# Patient Record
Sex: FEMALE | Race: WHITE | NOT HISPANIC OR LATINO | Employment: FULL TIME | RURAL
[De-identification: names, ages, dates, MRNs, and addresses within clinical notes are randomized per-mention and may not be internally consistent; named-entity substitution may affect disease eponyms.]

---

## 2022-12-09 ENCOUNTER — OFFICE VISIT (OUTPATIENT)
Dept: PRIMARY CARE CLINIC | Facility: CLINIC | Age: 37
End: 2022-12-09
Payer: COMMERCIAL

## 2022-12-09 VITALS
TEMPERATURE: 98 F | DIASTOLIC BLOOD PRESSURE: 98 MMHG | WEIGHT: 138.38 LBS | HEART RATE: 82 BPM | BODY MASS INDEX: 27.9 KG/M2 | SYSTOLIC BLOOD PRESSURE: 140 MMHG | HEIGHT: 59 IN | RESPIRATION RATE: 18 BRPM | OXYGEN SATURATION: 100 %

## 2022-12-09 DIAGNOSIS — F32.A ANXIETY AND DEPRESSION: ICD-10-CM

## 2022-12-09 DIAGNOSIS — F41.9 ANXIETY AND DEPRESSION: ICD-10-CM

## 2022-12-09 DIAGNOSIS — F17.200 TOBACCO DEPENDENCE: ICD-10-CM

## 2022-12-09 DIAGNOSIS — R03.0 ELEVATED BP WITHOUT DIAGNOSIS OF HYPERTENSION: Primary | ICD-10-CM

## 2022-12-09 LAB
ANION GAP SERPL CALCULATED.3IONS-SCNC: 9 MMOL/L (ref 7–16)
BASOPHILS # BLD AUTO: 0.06 K/UL (ref 0–0.2)
BASOPHILS NFR BLD AUTO: 0.6 % (ref 0–1)
BUN SERPL-MCNC: 14 MG/DL (ref 7–18)
BUN/CREAT SERPL: 18 (ref 6–20)
CALCIUM SERPL-MCNC: 8.8 MG/DL (ref 8.5–10.1)
CHLORIDE SERPL-SCNC: 110 MMOL/L (ref 98–107)
CO2 SERPL-SCNC: 23 MMOL/L (ref 21–32)
CREAT SERPL-MCNC: 0.78 MG/DL (ref 0.55–1.02)
DIFFERENTIAL METHOD BLD: ABNORMAL
EGFR (NO RACE VARIABLE) (RUSH/TITUS): 102 ML/MIN/1.73M²
EOSINOPHIL # BLD AUTO: 0.23 K/UL (ref 0–0.5)
EOSINOPHIL NFR BLD AUTO: 2.3 % (ref 1–4)
ERYTHROCYTE [DISTWIDTH] IN BLOOD BY AUTOMATED COUNT: 16.9 % (ref 11.5–14.5)
FERRITIN SERPL-MCNC: 5 NG/ML (ref 8–252)
GLUCOSE SERPL-MCNC: 94 MG/DL (ref 74–106)
HCT VFR BLD AUTO: 39.9 % (ref 38–47)
HGB BLD-MCNC: 12.1 G/DL (ref 12–16)
IMM GRANULOCYTES # BLD AUTO: 0.04 K/UL (ref 0–0.04)
IMM GRANULOCYTES NFR BLD: 0.4 % (ref 0–0.4)
LYMPHOCYTES # BLD AUTO: 2.04 K/UL (ref 1–4.8)
LYMPHOCYTES NFR BLD AUTO: 20.8 % (ref 27–41)
MCH RBC QN AUTO: 25.9 PG (ref 27–31)
MCHC RBC AUTO-ENTMCNC: 30.3 G/DL (ref 32–36)
MCV RBC AUTO: 85.3 FL (ref 80–96)
MONOCYTES # BLD AUTO: 1.06 K/UL (ref 0–0.8)
MONOCYTES NFR BLD AUTO: 10.8 % (ref 2–6)
MPC BLD CALC-MCNC: 12.5 FL (ref 9.4–12.4)
NEUTROPHILS # BLD AUTO: 6.39 K/UL (ref 1.8–7.7)
NEUTROPHILS NFR BLD AUTO: 65.1 % (ref 53–65)
NRBC # BLD AUTO: 0 X10E3/UL
NRBC, AUTO (.00): 0 %
PLATELET # BLD AUTO: 325 K/UL (ref 150–400)
POTASSIUM SERPL-SCNC: 4.4 MMOL/L (ref 3.5–5.1)
RBC # BLD AUTO: 4.68 M/UL (ref 4.2–5.4)
SODIUM SERPL-SCNC: 138 MMOL/L (ref 136–145)
T4 FREE SERPL-MCNC: 0.96 NG/DL (ref 0.76–1.46)
TSH SERPL DL<=0.005 MIU/L-ACNC: 3.04 UIU/ML (ref 0.36–3.74)
WBC # BLD AUTO: 9.82 K/UL (ref 4.5–11)

## 2022-12-09 PROCEDURE — 82728 ASSAY OF FERRITIN: CPT | Mod: ,,, | Performed by: CLINICAL MEDICAL LABORATORY

## 2022-12-09 PROCEDURE — 84443 THYROID PANEL: ICD-10-PCS | Mod: ,,, | Performed by: CLINICAL MEDICAL LABORATORY

## 2022-12-09 PROCEDURE — 82728 FERRITIN: ICD-10-PCS | Mod: ,,, | Performed by: CLINICAL MEDICAL LABORATORY

## 2022-12-09 PROCEDURE — 3077F SYST BP >= 140 MM HG: CPT | Mod: ,,, | Performed by: STUDENT IN AN ORGANIZED HEALTH CARE EDUCATION/TRAINING PROGRAM

## 2022-12-09 PROCEDURE — 1159F PR MEDICATION LIST DOCUMENTED IN MEDICAL RECORD: ICD-10-PCS | Mod: ,,, | Performed by: STUDENT IN AN ORGANIZED HEALTH CARE EDUCATION/TRAINING PROGRAM

## 2022-12-09 PROCEDURE — 80048 BASIC METABOLIC PANEL: ICD-10-PCS | Mod: ,,, | Performed by: CLINICAL MEDICAL LABORATORY

## 2022-12-09 PROCEDURE — 99406 BEHAV CHNG SMOKING 3-10 MIN: CPT | Mod: ,,, | Performed by: STUDENT IN AN ORGANIZED HEALTH CARE EDUCATION/TRAINING PROGRAM

## 2022-12-09 PROCEDURE — 99204 PR OFFICE/OUTPT VISIT, NEW, LEVL IV, 45-59 MIN: ICD-10-PCS | Mod: 25,,, | Performed by: STUDENT IN AN ORGANIZED HEALTH CARE EDUCATION/TRAINING PROGRAM

## 2022-12-09 PROCEDURE — 99406 PR TOBACCO USE CESSATION INTERMEDIATE 3-10 MINUTES: ICD-10-PCS | Mod: ,,, | Performed by: STUDENT IN AN ORGANIZED HEALTH CARE EDUCATION/TRAINING PROGRAM

## 2022-12-09 PROCEDURE — 84443 ASSAY THYROID STIM HORMONE: CPT | Mod: ,,, | Performed by: CLINICAL MEDICAL LABORATORY

## 2022-12-09 PROCEDURE — 3008F BODY MASS INDEX DOCD: CPT | Mod: ,,, | Performed by: STUDENT IN AN ORGANIZED HEALTH CARE EDUCATION/TRAINING PROGRAM

## 2022-12-09 PROCEDURE — 3008F PR BODY MASS INDEX (BMI) DOCUMENTED: ICD-10-PCS | Mod: ,,, | Performed by: STUDENT IN AN ORGANIZED HEALTH CARE EDUCATION/TRAINING PROGRAM

## 2022-12-09 PROCEDURE — 84439 THYROID PANEL: ICD-10-PCS | Mod: ,,, | Performed by: CLINICAL MEDICAL LABORATORY

## 2022-12-09 PROCEDURE — 1159F MED LIST DOCD IN RCRD: CPT | Mod: ,,, | Performed by: STUDENT IN AN ORGANIZED HEALTH CARE EDUCATION/TRAINING PROGRAM

## 2022-12-09 PROCEDURE — 99204 OFFICE O/P NEW MOD 45 MIN: CPT | Mod: 25,,, | Performed by: STUDENT IN AN ORGANIZED HEALTH CARE EDUCATION/TRAINING PROGRAM

## 2022-12-09 PROCEDURE — 3080F DIAST BP >= 90 MM HG: CPT | Mod: ,,, | Performed by: STUDENT IN AN ORGANIZED HEALTH CARE EDUCATION/TRAINING PROGRAM

## 2022-12-09 PROCEDURE — 80048 BASIC METABOLIC PNL TOTAL CA: CPT | Mod: ,,, | Performed by: CLINICAL MEDICAL LABORATORY

## 2022-12-09 PROCEDURE — 84439 ASSAY OF FREE THYROXINE: CPT | Mod: ,,, | Performed by: CLINICAL MEDICAL LABORATORY

## 2022-12-09 PROCEDURE — 3080F PR MOST RECENT DIASTOLIC BLOOD PRESSURE >= 90 MM HG: ICD-10-PCS | Mod: ,,, | Performed by: STUDENT IN AN ORGANIZED HEALTH CARE EDUCATION/TRAINING PROGRAM

## 2022-12-09 PROCEDURE — 3077F PR MOST RECENT SYSTOLIC BLOOD PRESSURE >= 140 MM HG: ICD-10-PCS | Mod: ,,, | Performed by: STUDENT IN AN ORGANIZED HEALTH CARE EDUCATION/TRAINING PROGRAM

## 2022-12-09 RX ORDER — BUSPIRONE HYDROCHLORIDE 5 MG/1
5 TABLET ORAL 2 TIMES DAILY PRN
Qty: 60 TABLET | Refills: 2 | Status: SHIPPED | OUTPATIENT
Start: 2022-12-09 | End: 2023-03-30 | Stop reason: SDUPTHER

## 2022-12-09 RX ORDER — ESCITALOPRAM OXALATE 10 MG/1
10 TABLET ORAL DAILY
Qty: 30 TABLET | Refills: 1 | Status: SHIPPED | OUTPATIENT
Start: 2022-12-09 | End: 2023-03-30 | Stop reason: DRUGHIGH

## 2022-12-09 NOTE — LETTER
December 9, 2022      [facility]  [address] [facility] [place] [address]  Phone: [phone]  Fax: [phone]       Patient: Karin Wright   YOB: 1985  Date of Visit: 12/09/2022    To Whom It May Concern:    Abbey Wright  was at [facility] on 12/09/2022. The patient should be excused from 12/8-12/10 and may return to work/school on 12/12/22 with no restrictions. If you have any questions or concerns, or if I can be of further assistance, please do not hesitate to contact me.    Sincerely,    Heidi Ovalle MD

## 2022-12-09 NOTE — PROGRESS NOTES
"Subjective:      Karin Wright is a 37 y.o.female who presents to clinic for Hypertension (Pt says she was sent home from work because her BP was high), Chest Pain, and Anxiety    Patient presents to clinic needing a work clearance form filled out and with complaints of anxiety.    Work clearance:  She was sent home from work yesterday due to elevated blood pressure (130/100). She states she was angry at the time, began having a headache and then chest pain and that is what caused them to check her blood pressure. She has never been diagnosed with blood pressure in the past. Denies any symptoms currently except for SOB which is chronic due to cigarette smoking.     Smoking:  She smokes 1/2ppd for the last 15 years. She has tried to stop on her own through quitting cold turkey, nicotine gum, and patches. She does have a history of seizures and is not currently on any medications for it. Last seizure was over 3 years ago. She thinks her seizures stopped because she stopped smoking marijuana. She lives with her wife and kids.     Anxiety:  She has a history of anxiety and depression that was treated with trazodone and lexapro in the past. She stopped taking her medications and hasn't been on anything in the last 10 years. She reports that she has even had to be hospitalized in high school due to her symptoms. Her anxiety is worsening and she is ready to start medications. Denies HI/AVH. She denies any SI or self harm "in awhile." She does have a thought of when she's driving that if she swerved the car off of the road, she'd die and it'd be okay. She has never gotten as far as I will swerve off of the road or made any plans. Stressors include: work and home life. She lives for her step daughters.     MARIANELA-7 anxiety scale    Not at all Several days More than half the days Nearly every day   Over the last 2 weeks, how often have you been bothered by the following problems?   1. Feeling nervous, anxious, or on edge 0 1 2 " 3   2. Not being able to stop or control worrying 0 1 2 3   3. Worrying too much about different things 0 1 2 3   4. Trouble relaxing 0 1 2 3   5. Being so restless that it is hard to sit still 0 1 2 3   6. Becoming easily annoyed or irritable 0 1 2 3   7. Feeling afraid as if something awful might happen 0 1 2 3     GAD7 score of 19    PHQ-9 depression questionnaire  Over the last 2 weeks, how often have you been bothered by any of the following problems? Not at all Several days More than half the days Nearly every day   Little interest or pleasure in doing things 0 1 2 3   Feeling down, depressed, or hopeless 0 1 2 3   Trouble falling or staying asleep, or sleeping too much 0 1 2 3   Feeling tired or having little energy 0 1 2 3   Poor appetite or overeating 0 1 2 3   Feeling bad about yourself, or that you are a failure, or that you have let yourself or your family down 0 1 2 3   Trouble concentrating on things, such as reading the newspaper or watching television 0 1 2 3   Moving or speaking so slowly that other people could have noticed? Or the opposite, being so fidgety or restless that you have been moving around a lot more than usual. 0 1 2 3   Thoughts that you would be better off dead, or of hurting yourself in some way 0 1 2 3     PHQ9 23      Review of Systems   Eyes:  Negative for blurred vision and double vision.   Respiratory:  Positive for shortness of breath.         Current smoker   Cardiovascular:  Negative for chest pain, palpitations and leg swelling.   Neurological:  Negative for dizziness and headaches.      Past Medical History:  has a past medical history of Anxiety, Asthma, Depression, Scoliosis, and Seizures.   Past Surgical History:  has no past surgical history on file.  Family History: family history includes Cancer in her maternal grandmother; Diabetes in her father; Hypertension in her mother.  Social History:  reports that she has been smoking cigarettes and vaping with nicotine. She  has a 7.50 pack-year smoking history. She has never used smokeless tobacco. She reports that she does not drink alcohol and does not use drugs.  Allergies:   Review of patient's allergies indicates:   Allergen Reactions    Hydrocodone Itching    Penicillins Hives       Objective:     Vitals:    12/09/22 1124   BP: (!) 140/98   Pulse:    Resp:    Temp:      Physical Exam  Vitals reviewed.   Constitutional:       General: She is not in acute distress.     Appearance: Normal appearance. She is not ill-appearing.   HENT:      Head: Normocephalic and atraumatic.      Right Ear: External ear normal.      Left Ear: External ear normal.   Eyes:      General: No scleral icterus.        Right eye: No discharge.         Left eye: No discharge.      Conjunctiva/sclera: Conjunctivae normal.   Cardiovascular:      Rate and Rhythm: Normal rate and regular rhythm.      Pulses: Normal pulses.   Pulmonary:      Effort: Pulmonary effort is normal. No respiratory distress.      Breath sounds: Normal breath sounds. No wheezing.   Musculoskeletal:      Right lower leg: No edema.      Left lower leg: No edema.   Neurological:      General: No focal deficit present.      Mental Status: She is alert.   Psychiatric:         Behavior: Behavior normal.          Assessment/Plan:     1. Elevated BP without diagnosis of hypertension  - counseled on DASH diet and lifestyle modifications  - check labs and monitor; if still elevated at next visit, discuss starting antihypertensives  - Basic Metabolic Panel; Future  - Thyroid Panel; Future  - CBC Auto Differential; Future  - Ferritin; Future  - Basic Metabolic Panel  - Thyroid Panel  - CBC Auto Differential  - Ferritin    2. Anxiety and depression  - PHQ9 and GAD7 elevated today  - check thyroid panel and start medications below  - she hasn't had SI/self harm behavior for at least a few weeks and not currently having ideas so safe to monitor   - suicide hotline info provided in AVS  - Basic Metabolic  Panel; Future  - Thyroid Panel; Future  - Basic Metabolic Panel  - Thyroid Panel  - busPIRone (BUSPAR) 5 MG Tab; Take 1 tablet (5 mg total) by mouth 2 (two) times daily as needed (anxiety).  Dispense: 60 tablet; Refill: 2  - EScitalopram oxalate (LEXAPRO) 10 MG tablet; Take 1 tablet (10 mg total) by mouth once daily.  Dispense: 30 tablet; Refill: 1    3. Tobacco dependence  - will prescribe chantix if kidney function is normal  - [38527] NM TOBACCO USE CESSATION INTERMEDIATE 3-10 MIN        Return to clinic in 1 month for f/u above or sooner if needed.     Heidi Ovalle MD  Family Medicine  12/09/2022        Tobacco Use/Cessation:  I assessed Karin Wright and discussed smoking cessation with her for 3-10 minutes. She is willing to accept help with smoking cessation today. She understands the risks of continued smoking including high blood pressure and lung cancer.   Social History     Tobacco Use   Smoking Status Every Day    Packs/day: 0.50    Years: 15.00    Pack years: 7.50    Types: Cigarettes, Vaping with nicotine   Smokeless Tobacco Never

## 2022-12-12 ENCOUNTER — TELEPHONE (OUTPATIENT)
Dept: LAB | Facility: CLINIC | Age: 37
End: 2022-12-12
Payer: COMMERCIAL

## 2022-12-12 RX ORDER — VARENICLINE TARTRATE 0.5 (11)-1
KIT ORAL
Qty: 1 EACH | Refills: 0 | Status: SHIPPED | OUTPATIENT
Start: 2022-12-12 | End: 2023-03-30

## 2022-12-12 RX ORDER — FERROUS SULFATE 325(65) MG
325 TABLET, DELAYED RELEASE (ENTERIC COATED) ORAL DAILY
Qty: 90 TABLET | Refills: 1 | Status: SHIPPED | OUTPATIENT
Start: 2022-12-12 | End: 2023-03-12

## 2022-12-12 NOTE — TELEPHONE ENCOUNTER
----- Message from Heidi Ovalle MD sent at 12/12/2022  8:11 AM CST -----  Please call the patient with the attached results. Overall, labs looked good. No signs of kidney problems or thyroid disorders. Iron was a little low which can cause fatigue. I have sent in an iron supplement she should start taking daily as well as chantix to help with smoking cessation. RTC as scheduled for f/u.

## 2023-03-30 ENCOUNTER — OFFICE VISIT (OUTPATIENT)
Dept: PRIMARY CARE CLINIC | Facility: CLINIC | Age: 38
End: 2023-03-30
Payer: COMMERCIAL

## 2023-03-30 VITALS
TEMPERATURE: 97 F | BODY MASS INDEX: 28.22 KG/M2 | HEART RATE: 82 BPM | RESPIRATION RATE: 18 BRPM | DIASTOLIC BLOOD PRESSURE: 90 MMHG | WEIGHT: 140 LBS | HEIGHT: 59 IN | SYSTOLIC BLOOD PRESSURE: 142 MMHG | OXYGEN SATURATION: 100 %

## 2023-03-30 DIAGNOSIS — G47.10 EXCESSIVE SLEEPINESS: ICD-10-CM

## 2023-03-30 DIAGNOSIS — I10 PRIMARY HYPERTENSION: ICD-10-CM

## 2023-03-30 DIAGNOSIS — F41.9 ANXIETY AND DEPRESSION: Primary | ICD-10-CM

## 2023-03-30 DIAGNOSIS — R22.1 MASS OF LEFT SIDE OF NECK: ICD-10-CM

## 2023-03-30 DIAGNOSIS — F32.A ANXIETY AND DEPRESSION: Primary | ICD-10-CM

## 2023-03-30 PROCEDURE — 3008F PR BODY MASS INDEX (BMI) DOCUMENTED: ICD-10-PCS | Mod: ,,, | Performed by: STUDENT IN AN ORGANIZED HEALTH CARE EDUCATION/TRAINING PROGRAM

## 2023-03-30 PROCEDURE — 3080F PR MOST RECENT DIASTOLIC BLOOD PRESSURE >= 90 MM HG: ICD-10-PCS | Mod: ,,, | Performed by: STUDENT IN AN ORGANIZED HEALTH CARE EDUCATION/TRAINING PROGRAM

## 2023-03-30 PROCEDURE — 99214 PR OFFICE/OUTPT VISIT, EST, LEVL IV, 30-39 MIN: ICD-10-PCS | Mod: ,,, | Performed by: STUDENT IN AN ORGANIZED HEALTH CARE EDUCATION/TRAINING PROGRAM

## 2023-03-30 PROCEDURE — 1159F PR MEDICATION LIST DOCUMENTED IN MEDICAL RECORD: ICD-10-PCS | Mod: ,,, | Performed by: STUDENT IN AN ORGANIZED HEALTH CARE EDUCATION/TRAINING PROGRAM

## 2023-03-30 PROCEDURE — 99214 OFFICE O/P EST MOD 30 MIN: CPT | Mod: ,,, | Performed by: STUDENT IN AN ORGANIZED HEALTH CARE EDUCATION/TRAINING PROGRAM

## 2023-03-30 PROCEDURE — 3008F BODY MASS INDEX DOCD: CPT | Mod: ,,, | Performed by: STUDENT IN AN ORGANIZED HEALTH CARE EDUCATION/TRAINING PROGRAM

## 2023-03-30 PROCEDURE — 3077F SYST BP >= 140 MM HG: CPT | Mod: ,,, | Performed by: STUDENT IN AN ORGANIZED HEALTH CARE EDUCATION/TRAINING PROGRAM

## 2023-03-30 PROCEDURE — 1159F MED LIST DOCD IN RCRD: CPT | Mod: ,,, | Performed by: STUDENT IN AN ORGANIZED HEALTH CARE EDUCATION/TRAINING PROGRAM

## 2023-03-30 PROCEDURE — 3077F PR MOST RECENT SYSTOLIC BLOOD PRESSURE >= 140 MM HG: ICD-10-PCS | Mod: ,,, | Performed by: STUDENT IN AN ORGANIZED HEALTH CARE EDUCATION/TRAINING PROGRAM

## 2023-03-30 PROCEDURE — 3080F DIAST BP >= 90 MM HG: CPT | Mod: ,,, | Performed by: STUDENT IN AN ORGANIZED HEALTH CARE EDUCATION/TRAINING PROGRAM

## 2023-03-30 RX ORDER — BUSPIRONE HYDROCHLORIDE 5 MG/1
5 TABLET ORAL 2 TIMES DAILY PRN
Qty: 60 TABLET | Refills: 1 | Status: SHIPPED | OUTPATIENT
Start: 2023-03-30 | End: 2023-10-25 | Stop reason: SDUPTHER

## 2023-03-30 RX ORDER — LISINOPRIL 10 MG/1
10 TABLET ORAL DAILY
Qty: 90 TABLET | Refills: 0 | Status: SHIPPED | OUTPATIENT
Start: 2023-03-30 | End: 2023-11-16 | Stop reason: SDUPTHER

## 2023-03-30 RX ORDER — ESCITALOPRAM OXALATE 20 MG/1
20 TABLET ORAL DAILY
Qty: 30 TABLET | Refills: 1 | Status: SHIPPED | OUTPATIENT
Start: 2023-03-30 | End: 2023-06-07 | Stop reason: SDUPTHER

## 2023-03-30 NOTE — PROGRESS NOTES
Subjective:      Karin Wright is a 37 y.o.female who presents to clinic for Follow-up (On elevated b/p and depression and anxiety that was controlled but out x 4 days.), Cyst (To neck x 3 months), and not sleeping well at night    Elevated Blood Pressure:    BP Readings from Last 3 Encounters:   03/30/23 (!) 142/90   12/09/22 (!) 140/98      Current medications: none  Blood pressures at home: 140/90s  Diet: does not watch salt intake; eats canned chili, beef jerky, drinks mountain dew due to working night shifts  Exercise: minimal    Lab Results   Component Value Date     12/09/2022    K 4.4 12/09/2022    CREATININE 0.78 12/09/2022     Depression and anxiety:  She is currently prescribed lexapro 10mg daily as well as buspar 5mg BID prn for anxiety. She has been out of her medication for about 4 days and she notices a difference in her mood. She states she has been mellow. Denies SI/HI/AVH. She states that she felt like her feelings of being overwhelmed and depressed did feel like it was worsening before she ran out of medications.     Neck mass:  It is located on her left neck. It has been there for the last 3 months. Stable in size. Nontender. No drainage or erythema.     Excessive sleepiness:  She reports having trouble with excessive sleepiness for awhile. Always thought it was due to her depression and anxiety. She sleeps 7-8 hours nightly, but she doesn't feel rested when she wakes up. She sleeps throughout the night without awakening. She snores per her wife, but no observed signs of apnea.     [place] Sleepiness score of 18  - High chance of nodding off while: sitting and reading, watching tv, sitting inactive, as a passenger in a car, lying down to rest, sitting quietly after a meal  - No chance of nodding off while: in a car while stopped briefly or sitting and talking to someone      Review of Systems   Eyes:  Positive for blurred vision. Negative for double vision.   Respiratory:  Negative for  shortness of breath.    Cardiovascular:  Positive for palpitations. Negative for chest pain and leg swelling.   Neurological:  Positive for dizziness and headaches.      Past Medical History:  has a past medical history of Anxiety, Asthma, Depression, Scoliosis, and Seizures.   Past Surgical History:  has no past surgical history on file.  Family History: family history includes Cancer in her maternal grandmother; Diabetes in her father; Hypertension in her mother.  Social History:  reports that she has been smoking cigarettes and vaping with nicotine. She has a 7.50 pack-year smoking history. She has never used smokeless tobacco. She reports that she does not drink alcohol and does not use drugs.  Allergies:   Review of patient's allergies indicates:   Allergen Reactions    Hydrocodone Itching    Penicillins Hives       Objective:     Vitals:    03/30/23 1059   BP: (!) 142/90   Pulse:    Resp:    Temp:      Physical Exam  Vitals reviewed.   Constitutional:       General: She is not in acute distress.     Appearance: Normal appearance. She is not ill-appearing.   HENT:      Head: Normocephalic and atraumatic.      Right Ear: External ear normal.      Left Ear: External ear normal.   Eyes:      General: No scleral icterus.        Right eye: No discharge.         Left eye: No discharge.      Conjunctiva/sclera: Conjunctivae normal.   Neck:     Cardiovascular:      Rate and Rhythm: Normal rate and regular rhythm.      Pulses: Normal pulses.   Pulmonary:      Effort: Pulmonary effort is normal. No respiratory distress.      Breath sounds: Normal breath sounds. No wheezing.   Musculoskeletal:      Right lower leg: No edema.      Left lower leg: No edema.   Neurological:      General: No focal deficit present.      Mental Status: She is alert.   Psychiatric:         Behavior: Behavior normal.          Assessment/Plan:     1. Anxiety and depression  - increase lexapro to 20mg daily; cont buspar  - EScitalopram oxalate  (LEXAPRO) 20 MG tablet; Take 1 tablet (20 mg total) by mouth once daily.  Dispense: 30 tablet; Refill: 1  - busPIRone (BUSPAR) 5 MG Tab; Take 1 tablet (5 mg total) by mouth 2 (two) times daily as needed (anxiety).  Dispense: 60 tablet; Refill: 1    2. Primary hypertension  - start lisinopril 10mg daily  - counseled on DASH diet and provided in AVS  - cont checking BP at home  - labs UTD  - lisinopriL 10 MG tablet; Take 1 tablet (10 mg total) by mouth once daily.  Dispense: 90 tablet; Refill: 0    3. Mass of left side of neck  - thyroid check in December normal  - US for further evaluation  - US Soft Tissue Head Neck Thyroid; Future  - US Soft Tissue Head Neck Thyroid    4. Excessive sleepiness  - [place] sleepiness score of 18  - will refer to sleep medicine for further evaluation  - Ambulatory referral/consult to Sleep Disorders; Future      Return to clinic in 1 month for f/u HTN and mood or sooner if needed.     Heidi Ovalle MD  Family Medicine  03/30/2023

## 2023-04-04 ENCOUNTER — TELEPHONE (OUTPATIENT)
Dept: PRIMARY CARE CLINIC | Facility: CLINIC | Age: 38
End: 2023-04-04
Payer: COMMERCIAL

## 2023-04-04 NOTE — TELEPHONE ENCOUNTER
----- Message from Heidi Ovalle MD sent at 4/4/2023 10:21 AM CDT -----  Regarding: results  Please call patient regarding neck ultrasound results. Benign appearing lymph node. No further testing is needed. Thanks.     VL

## 2023-04-27 ENCOUNTER — OFFICE VISIT (OUTPATIENT)
Dept: PRIMARY CARE CLINIC | Facility: CLINIC | Age: 38
End: 2023-04-27
Payer: COMMERCIAL

## 2023-04-27 VITALS
RESPIRATION RATE: 18 BRPM | TEMPERATURE: 98 F | BODY MASS INDEX: 28.22 KG/M2 | SYSTOLIC BLOOD PRESSURE: 132 MMHG | DIASTOLIC BLOOD PRESSURE: 78 MMHG | WEIGHT: 140 LBS | HEIGHT: 59 IN | OXYGEN SATURATION: 99 % | HEART RATE: 70 BPM

## 2023-04-27 DIAGNOSIS — R10.13 EPIGASTRIC ABDOMINAL PAIN: Primary | ICD-10-CM

## 2023-04-27 DIAGNOSIS — R11.2 NAUSEA AND VOMITING, UNSPECIFIED VOMITING TYPE: ICD-10-CM

## 2023-04-27 LAB
ANION GAP SERPL CALCULATED.3IONS-SCNC: 6 MMOL/L (ref 7–16)
BASOPHILS # BLD AUTO: 0.06 K/UL (ref 0–0.2)
BASOPHILS NFR BLD AUTO: 0.6 % (ref 0–1)
BUN SERPL-MCNC: 13 MG/DL (ref 7–18)
BUN/CREAT SERPL: 18 (ref 6–20)
CALCIUM SERPL-MCNC: 8.8 MG/DL (ref 8.5–10.1)
CHLORIDE SERPL-SCNC: 106 MMOL/L (ref 98–107)
CO2 SERPL-SCNC: 28 MMOL/L (ref 21–32)
CREAT SERPL-MCNC: 0.74 MG/DL (ref 0.55–1.02)
DIFFERENTIAL METHOD BLD: ABNORMAL
EGFR (NO RACE VARIABLE) (RUSH/TITUS): 108 ML/MIN/1.73M²
EOSINOPHIL # BLD AUTO: 0.26 K/UL (ref 0–0.5)
EOSINOPHIL NFR BLD AUTO: 2.5 % (ref 1–4)
ERYTHROCYTE [DISTWIDTH] IN BLOOD BY AUTOMATED COUNT: 18.5 % (ref 11.5–14.5)
GLUCOSE SERPL-MCNC: 75 MG/DL (ref 74–106)
HCT VFR BLD AUTO: 38.4 % (ref 38–47)
HGB BLD-MCNC: 11.7 G/DL (ref 12–16)
IMM GRANULOCYTES # BLD AUTO: 0.03 K/UL (ref 0–0.04)
IMM GRANULOCYTES NFR BLD: 0.3 % (ref 0–0.4)
LYMPHOCYTES # BLD AUTO: 3.85 K/UL (ref 1–4.8)
LYMPHOCYTES NFR BLD AUTO: 37.6 % (ref 27–41)
MCH RBC QN AUTO: 24.7 PG (ref 27–31)
MCHC RBC AUTO-ENTMCNC: 30.5 G/DL (ref 32–36)
MCV RBC AUTO: 81.2 FL (ref 80–96)
MONOCYTES # BLD AUTO: 1.3 K/UL (ref 0–0.8)
MONOCYTES NFR BLD AUTO: 12.7 % (ref 2–6)
MPC BLD CALC-MCNC: 11.8 FL (ref 9.4–12.4)
NEUTROPHILS # BLD AUTO: 4.74 K/UL (ref 1.8–7.7)
NEUTROPHILS NFR BLD AUTO: 46.3 % (ref 53–65)
NRBC # BLD AUTO: 0 X10E3/UL
NRBC, AUTO (.00): 0 %
PLATELET # BLD AUTO: 377 K/UL (ref 150–400)
POTASSIUM SERPL-SCNC: 5.3 MMOL/L (ref 3.5–5.1)
RBC # BLD AUTO: 4.73 M/UL (ref 4.2–5.4)
SODIUM SERPL-SCNC: 135 MMOL/L (ref 136–145)
WBC # BLD AUTO: 10.24 K/UL (ref 4.5–11)

## 2023-04-27 PROCEDURE — 4010F PR ACE/ARB THEARPY RXD/TAKEN: ICD-10-PCS | Mod: ,,, | Performed by: STUDENT IN AN ORGANIZED HEALTH CARE EDUCATION/TRAINING PROGRAM

## 2023-04-27 PROCEDURE — 3008F PR BODY MASS INDEX (BMI) DOCUMENTED: ICD-10-PCS | Mod: ,,, | Performed by: STUDENT IN AN ORGANIZED HEALTH CARE EDUCATION/TRAINING PROGRAM

## 2023-04-27 PROCEDURE — 99213 OFFICE O/P EST LOW 20 MIN: CPT | Mod: 25,,, | Performed by: STUDENT IN AN ORGANIZED HEALTH CARE EDUCATION/TRAINING PROGRAM

## 2023-04-27 PROCEDURE — 3078F PR MOST RECENT DIASTOLIC BLOOD PRESSURE < 80 MM HG: ICD-10-PCS | Mod: ,,, | Performed by: STUDENT IN AN ORGANIZED HEALTH CARE EDUCATION/TRAINING PROGRAM

## 2023-04-27 PROCEDURE — 80048 BASIC METABOLIC PANEL: ICD-10-PCS | Mod: ,,, | Performed by: CLINICAL MEDICAL LABORATORY

## 2023-04-27 PROCEDURE — 4010F ACE/ARB THERAPY RXD/TAKEN: CPT | Mod: ,,, | Performed by: STUDENT IN AN ORGANIZED HEALTH CARE EDUCATION/TRAINING PROGRAM

## 2023-04-27 PROCEDURE — 96372 PR INJECTION,THERAP/PROPH/DIAG2ST, IM OR SUBCUT: ICD-10-PCS | Mod: ,,, | Performed by: STUDENT IN AN ORGANIZED HEALTH CARE EDUCATION/TRAINING PROGRAM

## 2023-04-27 PROCEDURE — 85025 CBC WITH DIFFERENTIAL: ICD-10-PCS | Mod: ,,, | Performed by: CLINICAL MEDICAL LABORATORY

## 2023-04-27 PROCEDURE — 1159F PR MEDICATION LIST DOCUMENTED IN MEDICAL RECORD: ICD-10-PCS | Mod: ,,, | Performed by: STUDENT IN AN ORGANIZED HEALTH CARE EDUCATION/TRAINING PROGRAM

## 2023-04-27 PROCEDURE — 85025 COMPLETE CBC W/AUTO DIFF WBC: CPT | Mod: ,,, | Performed by: CLINICAL MEDICAL LABORATORY

## 2023-04-27 PROCEDURE — 99213 PR OFFICE/OUTPT VISIT, EST, LEVL III, 20-29 MIN: ICD-10-PCS | Mod: 25,,, | Performed by: STUDENT IN AN ORGANIZED HEALTH CARE EDUCATION/TRAINING PROGRAM

## 2023-04-27 PROCEDURE — 1159F MED LIST DOCD IN RCRD: CPT | Mod: ,,, | Performed by: STUDENT IN AN ORGANIZED HEALTH CARE EDUCATION/TRAINING PROGRAM

## 2023-04-27 PROCEDURE — 3075F PR MOST RECENT SYSTOLIC BLOOD PRESS GE 130-139MM HG: ICD-10-PCS | Mod: ,,, | Performed by: STUDENT IN AN ORGANIZED HEALTH CARE EDUCATION/TRAINING PROGRAM

## 2023-04-27 PROCEDURE — 3078F DIAST BP <80 MM HG: CPT | Mod: ,,, | Performed by: STUDENT IN AN ORGANIZED HEALTH CARE EDUCATION/TRAINING PROGRAM

## 2023-04-27 PROCEDURE — 80048 BASIC METABOLIC PNL TOTAL CA: CPT | Mod: ,,, | Performed by: CLINICAL MEDICAL LABORATORY

## 2023-04-27 PROCEDURE — 96372 THER/PROPH/DIAG INJ SC/IM: CPT | Mod: ,,, | Performed by: STUDENT IN AN ORGANIZED HEALTH CARE EDUCATION/TRAINING PROGRAM

## 2023-04-27 PROCEDURE — 3075F SYST BP GE 130 - 139MM HG: CPT | Mod: ,,, | Performed by: STUDENT IN AN ORGANIZED HEALTH CARE EDUCATION/TRAINING PROGRAM

## 2023-04-27 PROCEDURE — 3008F BODY MASS INDEX DOCD: CPT | Mod: ,,, | Performed by: STUDENT IN AN ORGANIZED HEALTH CARE EDUCATION/TRAINING PROGRAM

## 2023-04-27 RX ORDER — OMEPRAZOLE 40 MG/1
40 CAPSULE, DELAYED RELEASE ORAL DAILY
Qty: 30 CAPSULE | Refills: 11 | Status: SHIPPED | OUTPATIENT
Start: 2023-04-27 | End: 2024-03-07

## 2023-04-27 RX ORDER — PROMETHAZINE HYDROCHLORIDE 25 MG/ML
25 INJECTION, SOLUTION INTRAMUSCULAR; INTRAVENOUS
Status: COMPLETED | OUTPATIENT
Start: 2023-04-27 | End: 2023-04-27

## 2023-04-27 RX ORDER — ONDANSETRON 4 MG/1
4 TABLET, ORALLY DISINTEGRATING ORAL EVERY 8 HOURS PRN
Qty: 30 TABLET | Refills: 2 | Status: SHIPPED | OUTPATIENT
Start: 2023-04-27 | End: 2024-03-07

## 2023-04-27 RX ADMIN — PROMETHAZINE HYDROCHLORIDE 25 MG: 25 INJECTION, SOLUTION INTRAMUSCULAR; INTRAVENOUS at 09:04

## 2023-04-27 NOTE — PROGRESS NOTES
Subjective:      Karin Wright is a 37 y.o.female who presents to clinic for Abdominal Pain      Patient complaining of epigastric abdominal pain x 3 weeks. Rated 8/10 in severity. Characterized as burning. Not associated with food. Symptoms worsening. Associated with nausea, vomiting (NBNB), and constipation. She has taken gas-x for her symptoms. She thought it was due to smoking cessation so she didn't come to clinic for evaluation earlier. She has been staying hydrated with water. Denies chronic NSAID use. LMP: on her period now. She says she thinks her wife gave her stomach ulcer due to her constant nagging/fussing at her. Her wife took her to clinic today. e      ROS     Past Medical History:  has a past medical history of Anxiety, Asthma, Depression, Scoliosis, and Seizures.   Past Surgical History:  has no past surgical history on file.  Family History: family history includes Cancer in her maternal grandmother; Diabetes in her father; Hypertension in her mother.  Social History:  reports that she has been smoking cigarettes and vaping with nicotine. She has a 7.50 pack-year smoking history. She has never used smokeless tobacco. She reports that she does not drink alcohol and does not use drugs.  Allergies:   Review of patient's allergies indicates:   Allergen Reactions    Hydrocodone Itching    Penicillins Hives       Objective:     Vitals:    04/27/23 0904   BP: 132/78   Pulse:    Resp:    Temp:      Physical Exam  Vitals reviewed.   Constitutional:       General: She is not in acute distress.     Appearance: Normal appearance. She is ill-appearing. She is not toxic-appearing or diaphoretic.   HENT:      Head: Normocephalic and atraumatic.      Right Ear: External ear normal.      Left Ear: External ear normal.   Eyes:      General: No scleral icterus.        Right eye: No discharge.         Left eye: No discharge.      Conjunctiva/sclera: Conjunctivae normal.   Cardiovascular:      Rate and Rhythm:  Normal rate and regular rhythm.      Pulses: Normal pulses.   Pulmonary:      Effort: Pulmonary effort is normal. No respiratory distress.      Breath sounds: Normal breath sounds. No wheezing.   Abdominal:      General: Bowel sounds are normal. There is no distension.      Palpations: Abdomen is soft.      Tenderness: There is generalized abdominal tenderness. There is no guarding or rebound.   Neurological:      General: No focal deficit present.      Mental Status: She is alert.   Psychiatric:         Behavior: Behavior normal.          Assessment/Plan:     1. Epigastric abdominal pain  - management with below  - aggressive oral rehydration  - CBC Auto Differential; Future  - Basic Metabolic Panel; Future  - omeprazole (PRILOSEC) 40 MG capsule; Take 1 capsule (40 mg total) by mouth once daily.  Dispense: 30 capsule; Refill: 11  - promethazine injection 25 mg  - ondansetron (ZOFRAN-ODT) 4 MG TbDL; Take 1 tablet (4 mg total) by mouth every 8 (eight) hours as needed (nausea).  Dispense: 30 tablet; Refill: 2  - CBC Auto Differential  - Basic Metabolic Panel    2. Nausea and vomiting, unspecified vomiting type  - CBC Auto Differential; Future  - Basic Metabolic Panel; Future  - omeprazole (PRILOSEC) 40 MG capsule; Take 1 capsule (40 mg total) by mouth once daily.  Dispense: 30 capsule; Refill: 11  - promethazine injection 25 mg  - ondansetron (ZOFRAN-ODT) 4 MG TbDL; Take 1 tablet (4 mg total) by mouth every 8 (eight) hours as needed (nausea).  Dispense: 30 tablet; Refill: 2  - CBC Auto Differential  - Basic Metabolic Panel        Return to clinic in 2 weeks for f/u abdominal pain or sooner if needed.     Heidi Ovalle MD  Family Medicine  04/27/2023

## 2023-04-27 NOTE — LETTER
April 27, 2023      [facility]  [address] [facility] [address]  Phone: [phone]  Fax: [phone]       Patient: Karin Wright   YOB: 1985  Date of Visit: 04/27/2023    To Whom It May Concern:    Abbey Wright  was at [facility] on 04/27/2023. The patient may return to work/school on 5/1/23 with no restrictions. If you have any questions or concerns, or if I can be of further assistance, please do not hesitate to contact me.    Sincerely,    Heidi Ovalle MD

## 2023-06-07 DIAGNOSIS — F32.A ANXIETY AND DEPRESSION: ICD-10-CM

## 2023-06-07 DIAGNOSIS — F41.9 ANXIETY AND DEPRESSION: ICD-10-CM

## 2023-06-07 RX ORDER — ESCITALOPRAM OXALATE 20 MG/1
20 TABLET ORAL DAILY
Qty: 30 TABLET | Refills: 1 | Status: SHIPPED | OUTPATIENT
Start: 2023-06-07 | End: 2023-10-25 | Stop reason: SDUPTHER

## 2023-07-21 ENCOUNTER — PATIENT MESSAGE (OUTPATIENT)
Dept: ADMINISTRATIVE | Facility: HOSPITAL | Age: 38
End: 2023-07-21

## 2023-10-25 DIAGNOSIS — F32.A ANXIETY AND DEPRESSION: ICD-10-CM

## 2023-10-25 DIAGNOSIS — F41.9 ANXIETY AND DEPRESSION: ICD-10-CM

## 2023-10-25 RX ORDER — BUSPIRONE HYDROCHLORIDE 5 MG/1
5 TABLET ORAL 2 TIMES DAILY PRN
Qty: 60 TABLET | Refills: 1 | Status: SHIPPED | OUTPATIENT
Start: 2023-10-25 | End: 2024-01-11 | Stop reason: SDUPTHER

## 2023-10-25 RX ORDER — ESCITALOPRAM OXALATE 20 MG/1
20 TABLET ORAL DAILY
Qty: 30 TABLET | Refills: 1 | Status: SHIPPED | OUTPATIENT
Start: 2023-10-25 | End: 2024-01-11 | Stop reason: SDUPTHER

## 2023-11-16 ENCOUNTER — OFFICE VISIT (OUTPATIENT)
Dept: PRIMARY CARE CLINIC | Facility: CLINIC | Age: 38
End: 2023-11-16
Payer: COMMERCIAL

## 2023-11-16 VITALS
BODY MASS INDEX: 31.85 KG/M2 | TEMPERATURE: 98 F | HEIGHT: 59 IN | OXYGEN SATURATION: 100 % | WEIGHT: 158 LBS | DIASTOLIC BLOOD PRESSURE: 98 MMHG | RESPIRATION RATE: 18 BRPM | HEART RATE: 88 BPM | SYSTOLIC BLOOD PRESSURE: 152 MMHG

## 2023-11-16 DIAGNOSIS — Z02.1 PHYSICAL EXAM, PRE-EMPLOYMENT: Primary | ICD-10-CM

## 2023-11-16 DIAGNOSIS — I10 PRIMARY HYPERTENSION: ICD-10-CM

## 2023-11-16 PROCEDURE — 4010F ACE/ARB THERAPY RXD/TAKEN: CPT | Mod: ,,, | Performed by: STUDENT IN AN ORGANIZED HEALTH CARE EDUCATION/TRAINING PROGRAM

## 2023-11-16 PROCEDURE — 3080F PR MOST RECENT DIASTOLIC BLOOD PRESSURE >= 90 MM HG: ICD-10-PCS | Mod: ,,, | Performed by: STUDENT IN AN ORGANIZED HEALTH CARE EDUCATION/TRAINING PROGRAM

## 2023-11-16 PROCEDURE — 3008F PR BODY MASS INDEX (BMI) DOCUMENTED: ICD-10-PCS | Mod: ,,, | Performed by: STUDENT IN AN ORGANIZED HEALTH CARE EDUCATION/TRAINING PROGRAM

## 2023-11-16 PROCEDURE — 99214 PR OFFICE/OUTPT VISIT, EST, LEVL IV, 30-39 MIN: ICD-10-PCS | Mod: ,,, | Performed by: STUDENT IN AN ORGANIZED HEALTH CARE EDUCATION/TRAINING PROGRAM

## 2023-11-16 PROCEDURE — 99214 OFFICE O/P EST MOD 30 MIN: CPT | Mod: ,,, | Performed by: STUDENT IN AN ORGANIZED HEALTH CARE EDUCATION/TRAINING PROGRAM

## 2023-11-16 PROCEDURE — 3008F BODY MASS INDEX DOCD: CPT | Mod: ,,, | Performed by: STUDENT IN AN ORGANIZED HEALTH CARE EDUCATION/TRAINING PROGRAM

## 2023-11-16 PROCEDURE — 1160F RVW MEDS BY RX/DR IN RCRD: CPT | Mod: ,,, | Performed by: STUDENT IN AN ORGANIZED HEALTH CARE EDUCATION/TRAINING PROGRAM

## 2023-11-16 PROCEDURE — 1160F PR REVIEW ALL MEDS BY PRESCRIBER/CLIN PHARMACIST DOCUMENTED: ICD-10-PCS | Mod: ,,, | Performed by: STUDENT IN AN ORGANIZED HEALTH CARE EDUCATION/TRAINING PROGRAM

## 2023-11-16 PROCEDURE — 4010F PR ACE/ARB THEARPY RXD/TAKEN: ICD-10-PCS | Mod: ,,, | Performed by: STUDENT IN AN ORGANIZED HEALTH CARE EDUCATION/TRAINING PROGRAM

## 2023-11-16 PROCEDURE — 1159F MED LIST DOCD IN RCRD: CPT | Mod: ,,, | Performed by: STUDENT IN AN ORGANIZED HEALTH CARE EDUCATION/TRAINING PROGRAM

## 2023-11-16 PROCEDURE — 3077F PR MOST RECENT SYSTOLIC BLOOD PRESSURE >= 140 MM HG: ICD-10-PCS | Mod: ,,, | Performed by: STUDENT IN AN ORGANIZED HEALTH CARE EDUCATION/TRAINING PROGRAM

## 2023-11-16 PROCEDURE — 1159F PR MEDICATION LIST DOCUMENTED IN MEDICAL RECORD: ICD-10-PCS | Mod: ,,, | Performed by: STUDENT IN AN ORGANIZED HEALTH CARE EDUCATION/TRAINING PROGRAM

## 2023-11-16 PROCEDURE — 3077F SYST BP >= 140 MM HG: CPT | Mod: ,,, | Performed by: STUDENT IN AN ORGANIZED HEALTH CARE EDUCATION/TRAINING PROGRAM

## 2023-11-16 PROCEDURE — 3080F DIAST BP >= 90 MM HG: CPT | Mod: ,,, | Performed by: STUDENT IN AN ORGANIZED HEALTH CARE EDUCATION/TRAINING PROGRAM

## 2023-11-16 RX ORDER — LISINOPRIL 10 MG/1
10 TABLET ORAL DAILY
Qty: 90 TABLET | Refills: 0 | Status: SHIPPED | OUTPATIENT
Start: 2023-11-16 | End: 2024-01-11 | Stop reason: SDUPTHER

## 2023-11-16 NOTE — PROGRESS NOTES
Subjective:      Karin Wright is a 38 y.o.female who presents to clinic for work release    Patient presents to clinic for pre-employment physical.    She is applying to work at [employer] and needs work clearance because of her hx of HTN and reported allergy to flour. She states she possibly had a reaction to flour 2-3 years ago. She had to go to [facility] and they said it wasn't from the flour but due to shingles? Denies any further episodes of skin reaction since then.     Of note, pt has not been seen in our clinic since April of this year. She states she has been too busy with work to get her medication refills. Has been out of her meds for several months. ROS as below. She states she just needs medicine for weight loss and her adderall and that will make it better.       Review of Systems   Eyes:  Negative for blurred vision and double vision.        Black spots in vision at times   Respiratory:  Negative for shortness of breath.    Cardiovascular:  Negative for chest pain, palpitations and leg swelling.   Neurological:  Negative for dizziness and headaches.        Past Medical History:  has a past medical history of Anxiety, Asthma, Depression, Scoliosis, and Seizures.   Past Surgical History:  has no past surgical history on file.  Family History: family history includes Cancer in her maternal grandmother; Diabetes in her father; Hypertension in her mother.  Social History:  reports that she has been smoking vaping with nicotine. She has never used smokeless tobacco. She reports that she does not drink alcohol and does not use drugs.  Allergies:   Review of patient's allergies indicates:   Allergen Reactions    Hydrocodone Itching    Penicillins Hives       Objective:     Vitals:    11/16/23 0941   BP: (!) 158/100   Pulse:    Resp:    Temp:      Physical Exam  Vitals reviewed.   Constitutional:       General: She is not in acute distress.     Appearance: Normal appearance. She is not ill-appearing.   HENT:  Will send letter to PCP.      Head: Normocephalic and atraumatic.      Right Ear: External ear normal.      Left Ear: External ear normal.   Eyes:      General: No scleral icterus.        Right eye: No discharge.         Left eye: No discharge.      Conjunctiva/sclera: Conjunctivae normal.   Cardiovascular:      Rate and Rhythm: Normal rate and regular rhythm.      Pulses: Normal pulses.   Pulmonary:      Effort: Pulmonary effort is normal. No respiratory distress.      Breath sounds: Normal breath sounds. No wheezing.   Musculoskeletal:      Right lower leg: No edema.      Left lower leg: No edema.   Neurological:      General: No focal deficit present.      Mental Status: She is alert.   Psychiatric:         Speech: Speech is rapid and pressured.         Behavior: Behavior is hyperactive.            Assessment/Plan:     1. Physical exam, pre-employment  - from this limited exam, patient should be able to perform all job duties without restriction  - form signed and returned to patient    2. Primary hypertension  - regarding her uncontrolled hypertension, counseled on dangers of uncontrolled HTN and potential sequelae of such.   - will restart her meds today; # to financial assistance given to pt today  - RTC in 1 month for f/u  - lisinopriL 10 MG tablet; Take 1 tablet (10 mg total) by mouth once daily.  Dispense: 90 tablet; Refill: 0      Health Maintenance Due   Topic Date Due    Hepatitis C Screening  Never done    Cervical Cancer Screening  Never done    Lipid Panel  Never done    COVID-19 Vaccine (1) Never done    Pneumococcal Vaccines (Age 0-64) (1 - PCV) Never done    HIV Screening  Never done    TETANUS VACCINE  Never done    Hemoglobin A1c (Diabetic Prevention Screening)  Never done    Influenza Vaccine (1) Never done         Return to clinic in 1 month for f/u HTN or sooner if needed.     Heidi Perry MD  Family Medicine  11/16/2023

## 2024-01-11 DIAGNOSIS — F32.A ANXIETY AND DEPRESSION: ICD-10-CM

## 2024-01-11 DIAGNOSIS — I10 PRIMARY HYPERTENSION: ICD-10-CM

## 2024-01-11 DIAGNOSIS — F41.9 ANXIETY AND DEPRESSION: ICD-10-CM

## 2024-01-12 RX ORDER — ESCITALOPRAM OXALATE 20 MG/1
20 TABLET ORAL DAILY
Qty: 30 TABLET | Refills: 1 | Status: SHIPPED | OUTPATIENT
Start: 2024-01-12 | End: 2024-01-17 | Stop reason: SDUPTHER

## 2024-01-12 RX ORDER — LISINOPRIL 10 MG/1
10 TABLET ORAL DAILY
Qty: 90 TABLET | Refills: 0 | Status: SHIPPED | OUTPATIENT
Start: 2024-01-12 | End: 2025-01-11

## 2024-01-12 RX ORDER — BUSPIRONE HYDROCHLORIDE 5 MG/1
5 TABLET ORAL 2 TIMES DAILY PRN
Qty: 60 TABLET | Refills: 1 | Status: SHIPPED | OUTPATIENT
Start: 2024-01-12 | End: 2025-01-11

## 2024-01-17 DIAGNOSIS — F32.A ANXIETY AND DEPRESSION: ICD-10-CM

## 2024-01-17 DIAGNOSIS — F41.9 ANXIETY AND DEPRESSION: ICD-10-CM

## 2024-01-18 RX ORDER — ESCITALOPRAM OXALATE 20 MG/1
20 TABLET ORAL DAILY
Qty: 30 TABLET | Refills: 1 | Status: SHIPPED | OUTPATIENT
Start: 2024-01-18 | End: 2024-03-07 | Stop reason: SDUPTHER

## 2024-03-07 ENCOUNTER — OFFICE VISIT (OUTPATIENT)
Dept: FAMILY MEDICINE | Facility: CLINIC | Age: 39
End: 2024-03-07
Payer: COMMERCIAL

## 2024-03-07 VITALS
BODY MASS INDEX: 30.12 KG/M2 | HEART RATE: 87 BPM | TEMPERATURE: 98 F | DIASTOLIC BLOOD PRESSURE: 82 MMHG | HEIGHT: 59 IN | OXYGEN SATURATION: 99 % | SYSTOLIC BLOOD PRESSURE: 137 MMHG | WEIGHT: 149.38 LBS

## 2024-03-07 DIAGNOSIS — F32.A ANXIETY AND DEPRESSION: ICD-10-CM

## 2024-03-07 DIAGNOSIS — I10 PRIMARY HYPERTENSION: ICD-10-CM

## 2024-03-07 DIAGNOSIS — R07.9 CHEST PAIN, UNSPECIFIED TYPE: Primary | ICD-10-CM

## 2024-03-07 DIAGNOSIS — F41.9 ANXIETY AND DEPRESSION: ICD-10-CM

## 2024-03-07 LAB
ALBUMIN SERPL BCP-MCNC: 3.8 G/DL (ref 3.5–5)
ALBUMIN/GLOB SERPL: 1 {RATIO}
ALP SERPL-CCNC: 80 U/L (ref 37–98)
ALT SERPL W P-5'-P-CCNC: 23 U/L (ref 13–56)
ANION GAP SERPL CALCULATED.3IONS-SCNC: 9 MMOL/L (ref 7–16)
AST SERPL W P-5'-P-CCNC: 22 U/L (ref 15–37)
BASOPHILS # BLD AUTO: 0.06 K/UL (ref 0–0.2)
BASOPHILS NFR BLD AUTO: 0.5 % (ref 0–1)
BILIRUB SERPL-MCNC: 0.2 MG/DL (ref ?–1.2)
BUN SERPL-MCNC: 9 MG/DL (ref 7–18)
BUN/CREAT SERPL: 13 (ref 6–20)
CALCIUM SERPL-MCNC: 8.5 MG/DL (ref 8.5–10.1)
CHLORIDE SERPL-SCNC: 111 MMOL/L (ref 98–107)
CO2 SERPL-SCNC: 24 MMOL/L (ref 21–32)
CREAT SERPL-MCNC: 0.72 MG/DL (ref 0.55–1.02)
DIFFERENTIAL METHOD BLD: ABNORMAL
EGFR (NO RACE VARIABLE) (RUSH/TITUS): 111 ML/MIN/1.73M2
EOSINOPHIL # BLD AUTO: 0.18 K/UL (ref 0–0.5)
EOSINOPHIL NFR BLD AUTO: 1.5 % (ref 1–4)
ERYTHROCYTE [DISTWIDTH] IN BLOOD BY AUTOMATED COUNT: 15.3 % (ref 11.5–14.5)
GLOBULIN SER-MCNC: 3.7 G/DL (ref 2–4)
GLUCOSE SERPL-MCNC: 90 MG/DL (ref 74–106)
HCT VFR BLD AUTO: 36.2 % (ref 38–47)
HGB BLD-MCNC: 12.2 G/DL (ref 12–16)
IMM GRANULOCYTES # BLD AUTO: 0.03 K/UL (ref 0–0.04)
IMM GRANULOCYTES NFR BLD: 0.2 % (ref 0–0.4)
LYMPHOCYTES # BLD AUTO: 3.12 K/UL (ref 1–4.8)
LYMPHOCYTES NFR BLD AUTO: 25.5 % (ref 27–41)
MCH RBC QN AUTO: 27.1 PG (ref 27–31)
MCHC RBC AUTO-ENTMCNC: 33.7 G/DL (ref 32–36)
MCV RBC AUTO: 80.3 FL (ref 80–96)
MONOCYTES # BLD AUTO: 1.29 K/UL (ref 0–0.8)
MONOCYTES NFR BLD AUTO: 10.5 % (ref 2–6)
MPC BLD CALC-MCNC: 12.5 FL (ref 9.4–12.4)
NEUTROPHILS # BLD AUTO: 7.55 K/UL (ref 1.8–7.7)
NEUTROPHILS NFR BLD AUTO: 61.8 % (ref 53–65)
NRBC # BLD AUTO: 0 X10E3/UL
NRBC, AUTO (.00): 0 %
PLATELET # BLD AUTO: 332 K/UL (ref 150–400)
POTASSIUM SERPL-SCNC: 3.8 MMOL/L (ref 3.5–5.1)
PROT SERPL-MCNC: 7.5 G/DL (ref 6.4–8.2)
RBC # BLD AUTO: 4.51 M/UL (ref 4.2–5.4)
SODIUM SERPL-SCNC: 140 MMOL/L (ref 136–145)
TROPONIN I SERPL DL<=0.01 NG/ML-MCNC: <4 PG/ML
TSH SERPL DL<=0.005 MIU/L-ACNC: 0.86 UIU/ML (ref 0.36–3.74)
WBC # BLD AUTO: 12.23 K/UL (ref 4.5–11)

## 2024-03-07 PROCEDURE — 3008F BODY MASS INDEX DOCD: CPT | Mod: ,,, | Performed by: STUDENT IN AN ORGANIZED HEALTH CARE EDUCATION/TRAINING PROGRAM

## 2024-03-07 PROCEDURE — 1159F MED LIST DOCD IN RCRD: CPT | Mod: ,,, | Performed by: STUDENT IN AN ORGANIZED HEALTH CARE EDUCATION/TRAINING PROGRAM

## 2024-03-07 PROCEDURE — 1160F RVW MEDS BY RX/DR IN RCRD: CPT | Mod: ,,, | Performed by: STUDENT IN AN ORGANIZED HEALTH CARE EDUCATION/TRAINING PROGRAM

## 2024-03-07 PROCEDURE — 99214 OFFICE O/P EST MOD 30 MIN: CPT | Mod: 25,,, | Performed by: STUDENT IN AN ORGANIZED HEALTH CARE EDUCATION/TRAINING PROGRAM

## 2024-03-07 PROCEDURE — 3075F SYST BP GE 130 - 139MM HG: CPT | Mod: ,,, | Performed by: STUDENT IN AN ORGANIZED HEALTH CARE EDUCATION/TRAINING PROGRAM

## 2024-03-07 PROCEDURE — 3079F DIAST BP 80-89 MM HG: CPT | Mod: ,,, | Performed by: STUDENT IN AN ORGANIZED HEALTH CARE EDUCATION/TRAINING PROGRAM

## 2024-03-07 PROCEDURE — 80050 GENERAL HEALTH PANEL: CPT | Mod: ,,, | Performed by: CLINICAL MEDICAL LABORATORY

## 2024-03-07 PROCEDURE — 84484 ASSAY OF TROPONIN QUANT: CPT | Mod: ,,, | Performed by: CLINICAL MEDICAL LABORATORY

## 2024-03-07 PROCEDURE — 4010F ACE/ARB THERAPY RXD/TAKEN: CPT | Mod: ,,, | Performed by: STUDENT IN AN ORGANIZED HEALTH CARE EDUCATION/TRAINING PROGRAM

## 2024-03-07 PROCEDURE — 93000 ELECTROCARDIOGRAM COMPLETE: CPT | Mod: ,,, | Performed by: STUDENT IN AN ORGANIZED HEALTH CARE EDUCATION/TRAINING PROGRAM

## 2024-03-07 RX ORDER — ESCITALOPRAM OXALATE 20 MG/1
20 TABLET ORAL DAILY
Qty: 90 TABLET | Refills: 1 | Status: SHIPPED | OUTPATIENT
Start: 2024-03-07 | End: 2024-04-04 | Stop reason: SDUPTHER

## 2024-03-07 NOTE — PROGRESS NOTES
"Subjective:      Karin Wright is a 38 y.o.female who presents to clinic for Hypertension    Patient presents to clinic for work clearance and with complaints of chest pain.    Per patient, she was at work earlier today when she became upset and began having chest pain. Nurse checked her blood pressure and it was 163/113. She states her blood pressures are usually very well controlled with the bottom number getting down to the 70s. She is adherent to her lisinopril. She attributes the chest pain to nerves and the "after effects" of what happened this morning. Chest pain is midsternal that radiates to her right shoulder. Associated with clamminess of her hands and some trouble breathing. She said she has been out of her lexapro and this is probably why she is having these symptoms. She has since  and  her significant other and her mood is usually well controlled with the exception of today. Needs a refill of lexapro.       ROS     Past Medical History:  has a past medical history of Anxiety, Asthma, Depression, Scoliosis, and Seizures.   Past Surgical History:  has no past surgical history on file.  Family History: family history includes Cancer in her maternal grandmother; Diabetes in her father; Hypertension in her mother.  Social History:  reports that she has been smoking vaping with nicotine. She has never used smokeless tobacco. She reports that she does not drink alcohol and does not use drugs.  Allergies:   Review of patient's allergies indicates:   Allergen Reactions    Hydrocodone Itching    Penicillins Hives       Objective:     Vitals:    03/07/24 1454   BP: 137/82   Pulse: 87   Temp: 98.1 °F (36.7 °C)     Physical Exam  Vitals reviewed.   Constitutional:       General: She is not in acute distress.     Appearance: Normal appearance. She is not ill-appearing.   HENT:      Head: Normocephalic and atraumatic.      Right Ear: External ear normal.      Left Ear: External ear normal. "   Eyes:      General: No scleral icterus.        Right eye: No discharge.         Left eye: No discharge.      Conjunctiva/sclera: Conjunctivae normal.   Cardiovascular:      Rate and Rhythm: Normal rate and regular rhythm.      Pulses: Normal pulses.   Pulmonary:      Effort: Pulmonary effort is normal. No respiratory distress.      Breath sounds: Normal breath sounds. No wheezing.   Musculoskeletal:      Right lower leg: No edema.      Left lower leg: No edema.   Neurological:      General: No focal deficit present.      Mental Status: She is alert.   Psychiatric:         Behavior: Behavior normal.          Assessment/Plan:     1. Chest pain, unspecified type  - EKG with sinus rhythm per my read  - further evaluate with below; could be due to uncontrolled anxiety/depression since being off of medications if evaluation is normal  - pt did not want to go to ER for evaluation as she attributes her symptoms to her anger/nerves  - POCT EKG 12-LEAD (Manually Resulted by Ordering Provider)  - CBC Auto Differential; Future  - Comprehensive Metabolic Panel; Future  - TSH; Future  - Troponin I; Future  - X-Ray Chest 1 View; Future    2. Primary hypertension  - cont lisinopril 10mg daily    3. Anxiety and depression  - medication refilled  - EScitalopram oxalate (LEXAPRO) 20 MG tablet; Take 1 tablet (20 mg total) by mouth once daily.  Dispense: 90 tablet; Refill: 1      Return to clinic in 1 month for f/u or sooner if needed.     Heidi Perry MD  Family Medicine  03/07/2024

## 2024-03-07 NOTE — LETTER
March 7, 2024      [facility] [address]  [place] [address]  Phone: [phone]  Fax: [phone]       Patient: Karin Wright   YOB: 1985  Date of Visit: 03/07/2024    To Whom It May Concern:    Abbey Wright  was at [facility] on 03/07/2024. The patient may return to work/school on 3/11/24 with no restrictions. If you have any questions or concerns, or if I can be of further assistance, please do not hesitate to contact me.    Sincerely,    Heidi Perry MD

## 2024-04-04 ENCOUNTER — OFFICE VISIT (OUTPATIENT)
Dept: FAMILY MEDICINE | Facility: CLINIC | Age: 39
End: 2024-04-04
Payer: COMMERCIAL

## 2024-04-04 ENCOUNTER — PATIENT OUTREACH (OUTPATIENT)
Dept: ADMINISTRATIVE | Facility: HOSPITAL | Age: 39
End: 2024-04-04

## 2024-04-04 VITALS
OXYGEN SATURATION: 97 % | DIASTOLIC BLOOD PRESSURE: 82 MMHG | BODY MASS INDEX: 31.08 KG/M2 | WEIGHT: 154.19 LBS | HEART RATE: 94 BPM | SYSTOLIC BLOOD PRESSURE: 121 MMHG | HEIGHT: 59 IN | TEMPERATURE: 98 F

## 2024-04-04 DIAGNOSIS — Z13.31 POSITIVE DEPRESSION SCREENING: ICD-10-CM

## 2024-04-04 DIAGNOSIS — F32.A ANXIETY AND DEPRESSION: Primary | ICD-10-CM

## 2024-04-04 DIAGNOSIS — F41.9 ANXIETY AND DEPRESSION: Primary | ICD-10-CM

## 2024-04-04 PROCEDURE — 99214 OFFICE O/P EST MOD 30 MIN: CPT | Mod: ,,, | Performed by: STUDENT IN AN ORGANIZED HEALTH CARE EDUCATION/TRAINING PROGRAM

## 2024-04-04 PROCEDURE — 4010F ACE/ARB THERAPY RXD/TAKEN: CPT | Mod: ,,, | Performed by: STUDENT IN AN ORGANIZED HEALTH CARE EDUCATION/TRAINING PROGRAM

## 2024-04-04 RX ORDER — QUETIAPINE FUMARATE 25 MG/1
25 TABLET, FILM COATED ORAL NIGHTLY
Qty: 30 TABLET | Refills: 1 | Status: SHIPPED | OUTPATIENT
Start: 2024-04-04 | End: 2025-04-04

## 2024-04-04 RX ORDER — ESCITALOPRAM OXALATE 20 MG/1
20 TABLET ORAL DAILY
Qty: 30 TABLET | Refills: 1 | Status: SHIPPED | OUTPATIENT
Start: 2024-04-04 | End: 2025-04-04

## 2024-04-04 RX ORDER — BUSPIRONE HYDROCHLORIDE 10 MG/1
10 TABLET ORAL 2 TIMES DAILY
Qty: 60 TABLET | Refills: 2 | Status: SHIPPED | OUTPATIENT
Start: 2024-04-04 | End: 2025-04-04

## 2024-04-04 NOTE — PROGRESS NOTES
Subjective:      Karin Wright is a 38 y.o.female who presents to clinic for Follow-up (Per provider )    Patient returns to clinic for 1 month f/u of anxiety and depression.    She is prescribed lexapro 20mg daily and buspar 5mg BID prn for depression/anxiety. Medications restarted at our last visit because she had been out. She hasn't really noticed a difference in her mood since restarting her medications. She admits to seeing dark shadows and hearing voices. The voices don't tell her anything and it is not the voices of anyone that she knows. She has seen Psychiatry when she was younger but hasn't seen anyone in a long time. Denies SI. She does feel better since quitting her job.     PHQ-9 depression questionnaire  Over the last 2 weeks, how often have you been bothered by any of the following problems? Not at all Several days More than half the days Nearly every day   Little interest or pleasure in doing things 0 1 2 3   Feeling down, depressed, or hopeless 0 1 2 3   Trouble falling or staying asleep, or sleeping too much 0 1 2 3   Feeling tired or having little energy 0 1 2 3   Poor appetite or overeating 0 1 2 3   Feeling bad about yourself, or that you are a failure, or that you have let yourself or your family down 0 1 2 3   Trouble concentrating on things, such as reading the newspaper or watching television 0 1 2 3   Moving or speaking so slowly that other people could have noticed? Or the opposite, being so fidgety or restless that you have been moving around a lot more than usual. 0 1 2 3   Thoughts that you would be better off dead, or of hurting yourself in some way 0 1 2 3     PHQ9 score 21    MARIANELA-7 anxiety scale    Not at all Several days More than half the days Nearly every day   Over the last 2 weeks, how often have you been bothered by the following problems?   1. Feeling nervous, anxious, or on edge 0 1 2 3   2. Not being able to stop or control worrying 0 1 2 3   3. Worrying too much about  different things 0 1 2 3   4. Trouble relaxing 0 1 2 3   5. Being so restless that it is hard to sit still 0 1 2 3   6. Becoming easily annoyed or irritable 0 1 2 3   7. Feeling afraid as if something awful might happen 0 1 2 3     GAD7 score 19      ROS     Past Medical History:  has a past medical history of Anxiety, Asthma, Depression, Scoliosis, and Seizures.   Past Surgical History:  has no past surgical history on file.  Family History: family history includes Cancer in her maternal grandmother; Diabetes in her father; Hypertension in her mother.  Social History:  reports that she has been smoking vaping with nicotine. She has never used smokeless tobacco. She reports that she does not drink alcohol and does not use drugs.  Allergies:   Review of patient's allergies indicates:   Allergen Reactions    Hydrocodone Itching    Penicillins Hives       Objective:     Vitals:    04/04/24 1528   BP: 121/82   Pulse: 94   Temp: 98.2 °F (36.8 °C)     Physical Exam  Vitals reviewed.   Constitutional:       General: She is not in acute distress.     Appearance: Normal appearance. She is not ill-appearing, toxic-appearing or diaphoretic.      Comments: Wearing sunglasses   HENT:      Head: Normocephalic and atraumatic.   Eyes:      General: No scleral icterus.        Right eye: No discharge.         Left eye: No discharge.   Pulmonary:      Effort: Pulmonary effort is normal. No respiratory distress.   Neurological:      General: No focal deficit present.      Mental Status: She is alert.   Psychiatric:         Behavior: Behavior normal.            Assessment/Plan:     1. Anxiety and depression  - increase buspar to 10mg BID, cont lexapro, and start seroquel for treatment resistant depression (PHQ9 21 and GAD7 19)  - not currently at risk for self-harm  - busPIRone (BUSPAR) 10 MG tablet; Take 1 tablet (10 mg total) by mouth 2 (two) times daily.  Dispense: 60 tablet; Refill: 2  - QUEtiapine (SEROQUEL) 25 MG Tab; Take 1  tablet (25 mg total) by mouth nightly.  Dispense: 30 tablet; Refill: 1  - EScitalopram oxalate (LEXAPRO) 20 MG tablet; Take 1 tablet (20 mg total) by mouth once daily.  Dispense: 30 tablet; Refill: 1    2. Positive depression screening        Return to clinic in 1 month for f/u mood or sooner if needed.     Heidi Perry MD  Family Medicine  04/04/2024

## 2024-04-04 NOTE — PROGRESS NOTES
appointment 4/4/2024 . Appointment notes updated to include: due for Hep C, HIV, lipid panel, and pap smear to be compliant  Health Maintenance Due   Topic Date Due    Hepatitis C Screening  Never done    Cervical Cancer Screening  Never done    Lipid Panel  Never done    COVID-19 Vaccine (1) Never done    Pneumococcal Vaccines (Age 0-64) (1 of 2 - PCV) Never done    HIV Screening  Never done    TETANUS VACCINE  Never done    Hemoglobin A1c (Diabetic Prevention Screening)  Never done